# Patient Record
Sex: MALE | Race: BLACK OR AFRICAN AMERICAN | NOT HISPANIC OR LATINO | Employment: STUDENT | URBAN - METROPOLITAN AREA
[De-identification: names, ages, dates, MRNs, and addresses within clinical notes are randomized per-mention and may not be internally consistent; named-entity substitution may affect disease eponyms.]

---
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
## 2025-01-14 ENCOUNTER — APPOINTMENT (OUTPATIENT)
Dept: PRIMARY CARE | Facility: CLINIC | Age: 20
End: 2025-01-14
Payer: COMMERCIAL

## 2025-01-14 VITALS
BODY MASS INDEX: 26.87 KG/M2 | SYSTOLIC BLOOD PRESSURE: 119 MMHG | HEART RATE: 57 BPM | WEIGHT: 209.4 LBS | DIASTOLIC BLOOD PRESSURE: 72 MMHG | HEIGHT: 74 IN

## 2025-01-14 DIAGNOSIS — N48.1 BALANITIS: Primary | ICD-10-CM

## 2025-01-14 DIAGNOSIS — Z48.816 ENCOUNTER FOR ASSESSMENT OF CIRCUMCISION: ICD-10-CM

## 2025-01-14 DIAGNOSIS — Z00.00 ROUTINE GENERAL MEDICAL EXAMINATION AT A HEALTH CARE FACILITY: ICD-10-CM

## 2025-01-14 PROCEDURE — 99385 PREV VISIT NEW AGE 18-39: CPT | Performed by: FAMILY MEDICINE

## 2025-01-14 PROCEDURE — 3008F BODY MASS INDEX DOCD: CPT | Performed by: FAMILY MEDICINE

## 2025-01-14 RX ORDER — OMEGA-3S/DHA/EPA/FISH OIL/D3 300MG-1000
2000 CAPSULE ORAL
COMMUNITY
Start: 2024-02-28

## 2025-01-14 NOTE — PROGRESS NOTES
Subjective:  HPI:     Yearly:          Complaints: none has a rash around penis         =    ROS:   Has had redness and itching with cuts around the  glans and prepuce Not circumsized            Unexplained Weight Change no.    Pain anywhere in your body no.    Problems with urination no.    Fever, chills, or sweats no.     Depression or anxiety problems no.    Do you feel threatened by anyone No.             Objective:  Physical Examination:      GENERAL:          General Appearance:  well-developed, well-nourished, no functional handicap, well-groomed.          Hygiene:  good.          Ill-appearance:  none.          Mental Status:  alert and oriented.          Mood/Affect:  pleasant, appropriate.          Speech:  clear.          Eye contact:  normal.          Appears stated age:  yes.      Ear nose and throat       normal mucosa is tympanic membranes normal no erythema or exudates     LUNGS:          Effort:  no respiratory distress.        ABDOMEN:          Tenderness:  none.          Distention:  none.           DERMATOLOGY:          Skin:  normal.      EXTREMITIES:          Normal:  no anomalies.          Edema:  none.      NEUROLOGICAL:          Orientation:  alert and oriented x 3.      PSYCHOLOGY:          Affect:  appropriate.          Mood:  pleasant.      Penis glans mild erythema     Assessment:  Encounter Diagnoses   Name Primary?    Balanitis Yes    Encounter for assessment of circumcision     Routine general medical examination at a health care facility         -Pap not indicated until 21.  Or after the start of sexual activity

## 2025-01-15 NOTE — PROGRESS NOTES
Subjective   Patient ID: Manohar Shaffer is a 19 y.o. male    HPI  19 y.o. male who presents to \[facility]\"" with his sister for an assessment for circumcision. He was referred by his PCP Dr. Santoyo. He reports a recent cut on the foreskin that caused some bleeding. He reports pain when the foreskin is retracted.       Review of Systems    All systems were reviewed. Anything negative was noted in the HPI.    Objective   Physical Exam    General: Well developed, well nourished, alert and cooperative, appears in no acute distress   Eyes: Non-injected conjunctiva, sclera clear, no proptosis   Cardiac: Extremities are warm and well perfused. No edema, cyanosis or pallor   Lungs: Breathing is easy, non-labored. Speaking in clear and complete sentences. Normal diaphragmatic movement   MSK: Ambulatory with steady gait, unassisted   Neuro: Alert and oriented to person, place, and time   Psych: Demonstrates good judgment and reason, without hallucinations, abnormal affect or abnormal behaviors   Skin: No obvious lesions, no rashes       No CVA tenderness bilaterally   No suprapubic pain or discomfort   Normal penile exam, uncircumcised, retractile foreskin     Past Medical History:   Diagnosis Date    Personal history of other specified conditions 06/07/2017    History of abdominal pain         No past surgical history on file.        Assessment/Plan   Hx of Balanitis, Encounter for assessment of circumcision    19 y.o. male who presents for the above condition, We had a very long and extensive discussion with the patient regarding the pathophysiology, differential diagnosis, risk factor, management, natural history, incidence and diagnostic work-up of the condition.     No medical need for medical circumcision       Plan:  - Follow-up as needed    E&M visit today is associated with current or anticipated ongoing medical care services related to a patient's single, serious condition or a complex condition.      1/16/2025    Scribe Attestation  By signing my name below, I, Thien Graham attest that this documentation has been prepared under the direction and in the presence of Dr. Jonathan Yanez.

## 2025-01-16 ENCOUNTER — OFFICE VISIT (OUTPATIENT)
Dept: UROLOGY | Facility: HOSPITAL | Age: 20
End: 2025-01-16
Payer: COMMERCIAL

## 2025-01-16 DIAGNOSIS — Z48.816 ENCOUNTER FOR ASSESSMENT OF CIRCUMCISION: ICD-10-CM

## 2025-01-16 DIAGNOSIS — N48.1 BALANITIS: ICD-10-CM

## 2025-01-16 PROCEDURE — 99214 OFFICE O/P EST MOD 30 MIN: CPT | Performed by: STUDENT IN AN ORGANIZED HEALTH CARE EDUCATION/TRAINING PROGRAM

## 2025-01-16 PROCEDURE — 99204 OFFICE O/P NEW MOD 45 MIN: CPT | Performed by: STUDENT IN AN ORGANIZED HEALTH CARE EDUCATION/TRAINING PROGRAM

## 2025-01-26 PROBLEM — Z48.816 ENCOUNTER FOR ASSESSMENT OF CIRCUMCISION: Status: ACTIVE | Noted: 2025-01-26

## 2025-01-26 PROBLEM — Z00.00 ROUTINE GENERAL MEDICAL EXAMINATION AT A HEALTH CARE FACILITY: Status: ACTIVE | Noted: 2025-01-26

## 2025-01-26 PROBLEM — N48.1 BALANITIS: Status: ACTIVE | Noted: 2025-01-26
